# Patient Record
Sex: FEMALE | Race: WHITE
[De-identification: names, ages, dates, MRNs, and addresses within clinical notes are randomized per-mention and may not be internally consistent; named-entity substitution may affect disease eponyms.]

---

## 2017-06-26 ENCOUNTER — HOSPITAL ENCOUNTER (OUTPATIENT)
Dept: HOSPITAL 61 - MAMMO | Age: 77
Discharge: HOME | End: 2017-06-26
Attending: FAMILY MEDICINE
Payer: MEDICARE

## 2017-06-26 DIAGNOSIS — Z12.31: Primary | ICD-10-CM

## 2017-06-26 NOTE — RAD
DATE: June 26, 2017



EXAM: DIGITAL SCREEN BILAT W/CAD



HISTORY: Screening study



COMPARISON: 2015 and 2016



This study was interpreted with the benefit of Computerized Aided Detection

(CAD).







FINDINGS:



The breast parenchyma shows scattered fibroglandular densities.  There are no

dominant suspicious masses, suspicious microcalcifications or evidence of

architectural distortion.





IMPRESSION: No mammographic indicators for malignancy.







BI-RADS CATEGORY: 1 NEGATIVE



RECOMMENDED FOLLOW-UP: 12M 12 MONTH FOLLOW-UP



PQRS compliance statement: Patient information was entered into a reminder

system with a target due date June 27, 2018 for the next mammogram.



Mammography is a sensitive method for finding small breast cancers, but it

does not detect them all and is not a substitute for careful clinical

examination.  A negative mammogram does not negate a clinically suspicious

finding and should not result in delay in biopsying a clinically suspicious

abnormality.



"Our facility is accredited by the [employer] Mammography

Program."





The patient's breast density may affect the ability of mammography to detect

breast cancer. There are 4 categories of breast density, A, B, C and D. Breast

density A means that most of the breast tissue is replaced with adipose tissue

and therefore is not dense. Breast density B means that the breast tissue is

mildly dense and scattered. Breast density C means that the breast tissue is

heterogeneously dense. Breast density D means that the breast tissue is very

dense. Breast densities especially C and D may decrease the sensitivity of

mammography to detect breast cancer. Therefore, the patient may benefit from

3-D breast mammography (3D breast tomography) as a part of their screening

mammogram. Insurance may or may not pay for this additional imaging. The

patient's breast density based on today's mammogram is category B.

## 2018-07-26 ENCOUNTER — HOSPITAL ENCOUNTER (EMERGENCY)
Dept: HOSPITAL 61 - ER | Age: 78
Discharge: HOME | End: 2018-07-26
Payer: MEDICARE

## 2018-07-26 DIAGNOSIS — Z86.79: ICD-10-CM

## 2018-07-26 DIAGNOSIS — N39.0: Primary | ICD-10-CM

## 2018-07-26 DIAGNOSIS — Z95.0: ICD-10-CM

## 2018-07-26 DIAGNOSIS — R06.02: ICD-10-CM

## 2018-07-26 DIAGNOSIS — I10: ICD-10-CM

## 2018-07-26 DIAGNOSIS — R07.89: ICD-10-CM

## 2018-07-26 LAB
ADD MAN DIFF?: NO
ALBUMIN SERPL-MCNC: 3.7 G/DL (ref 3.4–5)
ALBUMIN/GLOB SERPL: 1.2 {RATIO} (ref 1–1.7)
ALP SERPL-CCNC: 73 U/L (ref 46–116)
ALT (SGPT): 20 U/L (ref 14–59)
ANION GAP SERPL CALC-SCNC: 9 MMOL/L (ref 6–14)
AST SERPL-CCNC: 16 U/L (ref 15–37)
BACTERIA,URINE: (no result) /HPF
BASO #: 0.1 X10^3/UL (ref 0–0.2)
BASO %: 1 % (ref 0–3)
BILIRUBIN,URINE: NEGATIVE
BLOOD UREA NITROGEN: 17 MG/DL (ref 7–20)
BUN/CREAT SERPL: 17 (ref 6–20)
CALCIUM: 9.3 MG/DL (ref 8.5–10.1)
CHLORIDE: 105 MMOL/L (ref 98–107)
CLARITY,URINE: CLEAR
CO2 SERPL-SCNC: 29 MMOL/L (ref 21–32)
COLOR,URINE: YELLOW
CREAT SERPL-MCNC: 1 MG/DL (ref 0.6–1)
EOS #: 0.1 X10^3/UL (ref 0–0.7)
EOS %: 1 % (ref 0–3)
GFR SERPLBLD BASED ON 1.73 SQ M-ARVRAT: 53.3 ML/MIN
GLOBULIN SER-MCNC: 3 G/DL (ref 2.2–3.8)
GLUCOSE SERPL-MCNC: 125 MG/DL (ref 70–99)
GLUCOSE,URINE: NEGATIVE MG/DL
HCG SERPL-ACNC: 7.9 X10^3/UL (ref 4–11)
HEMATOCRIT: 45 % (ref 36–47)
HEMOGLOBIN: 15.3 G/DL (ref 12–15.5)
LYMPH #: 2.1 X10^3/UL (ref 1–4.8)
LYMPH %: 26 % (ref 24–48)
MAGNESIUM: 2.1 MG/DL (ref 1.8–2.4)
MEAN CORPUSCULAR HEMOGLOBIN: 28 PG (ref 25–35)
MEAN CORPUSCULAR HGB CONC: 34 G/DL (ref 31–37)
MEAN CORPUSCULAR VOLUME: 82 FL (ref 79–100)
MONO #: 0.7 X10^3/UL (ref 0–1.1)
MONO %: 10 % (ref 0–9)
NEUT #: 5 X10^3UL (ref 1.8–7.7)
NEUT %: 63 % (ref 31–73)
NITRITE,URINE: POSITIVE
PH,URINE: 7.5
PLATELET COUNT: 204 X10^3/UL (ref 140–400)
POTASSIUM SERPL-SCNC: 4.5 MMOL/L (ref 3.5–5.1)
PROTEIN,URINE: NEGATIVE MG/DL
RBC,URINE: 0 /HPF (ref 0–2)
RED BLOOD COUNT: 5.49 X10^6/UL (ref 3.5–5.4)
RED CELL DISTRIBUTION WIDTH: 14.6 % (ref 11.5–14.5)
SODIUM: 143 MMOL/L (ref 136–145)
SPECIFIC GRAVITY,URINE: 1.01
TOTAL BILIRUBIN: 0.5 MG/DL (ref 0.2–1)
TOTAL PROTEIN: 6.7 G/DL (ref 6.4–8.2)
TROPONINI: < 0.017 NG/ML (ref 0–0.06)
UROBILINOGEN,URINE: 0.2 MG/DL

## 2018-07-26 PROCEDURE — 87086 URINE CULTURE/COLONY COUNT: CPT

## 2018-07-26 PROCEDURE — 93005 ELECTROCARDIOGRAM TRACING: CPT

## 2018-07-26 PROCEDURE — 96365 THER/PROPH/DIAG IV INF INIT: CPT

## 2018-07-26 PROCEDURE — 83735 ASSAY OF MAGNESIUM: CPT

## 2018-07-26 PROCEDURE — 71045 X-RAY EXAM CHEST 1 VIEW: CPT

## 2018-07-26 PROCEDURE — 36415 COLL VENOUS BLD VENIPUNCTURE: CPT

## 2018-07-26 PROCEDURE — 85025 COMPLETE CBC W/AUTO DIFF WBC: CPT

## 2018-07-26 PROCEDURE — 80053 COMPREHEN METABOLIC PANEL: CPT

## 2018-07-26 PROCEDURE — 81001 URINALYSIS AUTO W/SCOPE: CPT

## 2018-07-26 PROCEDURE — 99285 EMERGENCY DEPT VISIT HI MDM: CPT

## 2018-07-26 PROCEDURE — 84484 ASSAY OF TROPONIN QUANT: CPT

## 2018-07-26 RX ADMIN — BACITRACIN 1 MLS/HR: 5000 INJECTION, POWDER, FOR SOLUTION INTRAMUSCULAR at 09:32

## 2018-07-28 VITALS
DIASTOLIC BLOOD PRESSURE: 70 MMHG | SYSTOLIC BLOOD PRESSURE: 167 MMHG | DIASTOLIC BLOOD PRESSURE: 70 MMHG | DIASTOLIC BLOOD PRESSURE: 70 MMHG | SYSTOLIC BLOOD PRESSURE: 167 MMHG | DIASTOLIC BLOOD PRESSURE: 70 MMHG | SYSTOLIC BLOOD PRESSURE: 167 MMHG | DIASTOLIC BLOOD PRESSURE: 70 MMHG | SYSTOLIC BLOOD PRESSURE: 167 MMHG | DIASTOLIC BLOOD PRESSURE: 70 MMHG | SYSTOLIC BLOOD PRESSURE: 167 MMHG | SYSTOLIC BLOOD PRESSURE: 167 MMHG

## 2018-09-05 ENCOUNTER — HOSPITAL ENCOUNTER (OUTPATIENT)
Dept: HOSPITAL 61 - MAMMO | Age: 78
Discharge: HOME | End: 2018-09-05
Attending: FAMILY MEDICINE
Payer: MEDICARE

## 2018-09-05 DIAGNOSIS — Z12.31: Primary | ICD-10-CM

## 2018-09-05 DIAGNOSIS — Z82.49: ICD-10-CM

## 2018-09-05 DIAGNOSIS — Z83.3: ICD-10-CM

## 2018-09-05 DIAGNOSIS — Z90.49: ICD-10-CM

## 2018-09-05 DIAGNOSIS — Z86.79: ICD-10-CM

## 2018-09-05 DIAGNOSIS — Z95.0: ICD-10-CM

## 2018-09-05 DIAGNOSIS — Z90.710: ICD-10-CM

## 2018-09-05 DIAGNOSIS — I10: ICD-10-CM

## 2018-09-05 PROCEDURE — 77067 SCR MAMMO BI INCL CAD: CPT

## 2018-09-05 PROCEDURE — 77063 BREAST TOMOSYNTHESIS BI: CPT

## 2018-09-06 NOTE — RAD
DATE: 9/5/2018



EXAM: MAMMO [place] SCREENING BILATERAL



HISTORY: Routine screening



COMPARISON: 6/26/2017



This study was interpreted with the benefit of Computerized Aided Detection

(CAD).





Breast Density: SCATTERED The breast parenchyma shows scattered fibroglandular

densities. Breast parenchyma level B.





FINDINGS: 2-D and 3-D tomosynthesis imaging was performed in CC and MLO

projections.  No new or enlarging breast densities are seen.  Benign type

calcifications are evident.  No suspicious microcalcifications have developed.

 





IMPRESSION: Stable mammograms without evidence of malignancy.







BI-RADS CATEGORY: 2 BENIGN FINDING(S)



RECOMMENDED FOLLOW-UP: 12M 12 MONTH FOLLOW-UP



PQRS compliance statement: Patient information was entered into a reminder

system with a target due date     for the next mammogram.



Mammography is a sensitive method for finding small breast cancers, but it

does not detect them all and is not a substitute for careful clinical

examination.  A negative mammogram does not negate a clinically suspicious

finding and should not result in delay in biopsying a clinically suspicious

abnormality.



"Our facility is accredited by the [employer] Mammography

Program."

## 2019-08-29 ENCOUNTER — HOSPITAL ENCOUNTER (OUTPATIENT)
Dept: HOSPITAL 61 - MAMMO | Age: 79
Discharge: HOME | End: 2019-08-29
Attending: FAMILY MEDICINE
Payer: MEDICARE

## 2019-08-29 DIAGNOSIS — N64.89: ICD-10-CM

## 2019-08-29 DIAGNOSIS — Z12.31: Primary | ICD-10-CM

## 2019-08-29 PROCEDURE — 77063 BREAST TOMOSYNTHESIS BI: CPT

## 2019-08-29 PROCEDURE — 77067 SCR MAMMO BI INCL CAD: CPT

## 2019-08-30 NOTE — RAD
BILATERAL SCREENING MAMMOGRAM, 3-D

 

History: Routine screening.

 

Comparison:  9/5/2018 and 6/26/2017 mammographic exams. 

 

Technique:  MLO and CC digital tomosynthesis (3D) images obtained. 

Radiologist reviewed these images on dedicated workstation. CAD was 

utilized. Or

 

Findings: 

Breast Tissue Density B : There are scattered areas of fibroglandular 

density.

 

There are no dominant masses, suspicious microcalcifications, or 

architectural distortion.

 

IMPRESSION:

No mammographic evidence of malignancy. Recommend routine screening.

 

BI-RADS category 1:  Negative.

 

The images were reviewed with computer-aided detection.

 

Patient information is entered into reminder system with a target due date

for the next screening mammogram.

 

Mammography is the most sensitive method for finding small breast cancers,

but it does not detect them all and is not a substitute for careful 

clinical examination. A negative mammogram does not negate a clinically 

suspicious finding and should not result in delay in biopsying a 

clinically suspicious abnormality.

 

 "Our facility is accredited by the American [employer] 

Mammography Program."

 

Electronically signed by: Lonny Flores MD (8/30/2019 3:43 PM) [facility]

## 2019-11-14 ENCOUNTER — HOSPITAL ENCOUNTER (OUTPATIENT)
Dept: HOSPITAL 61 - ECHO | Age: 79
Discharge: HOME | End: 2019-11-14
Attending: INTERNAL MEDICINE
Payer: MEDICARE

## 2019-11-14 DIAGNOSIS — I05.9: Primary | ICD-10-CM

## 2019-11-14 DIAGNOSIS — I11.9: ICD-10-CM

## 2019-11-14 DIAGNOSIS — I25.10: ICD-10-CM

## 2019-11-14 PROCEDURE — 93306 TTE W/DOPPLER COMPLETE: CPT

## 2019-11-14 NOTE — CARD
MR#: [id number]

Account#: [id number]

Accession#: [id number]

Date of Study: 11/14/2019

Ordering Physician: JAYLA STEWART, 

Referring Physician: JAYLA STEWART 

Tech: Sierra Blake RDCS





--------------- APPROVED REPORT --------------





EXAM: Two-dimensional and M-mode echocardiogram with Doppler and color Doppler.



Other Information 

Quality : Good



INDICATION

Cardiac Disease: CAD 



Surgery/Intervention

Pacemaker: 



2D DIMENSIONS 

RVDd2.3 (2.9-3.5cm)Left Atrium(2D)2.9 (1.6-4.0cm)

IVSd1.2 (0.7-1.1cm)Aortic Root(2D)2.7 (2.0-3.7cm)

LVDd4.1 (3.9-5.9cm)LVOT Diameter2.1 (1.8-2.4cm)

PWd0.8 (0.7-1.1cm)LVDs2.9 (2.5-4.0cm)

FS (%) 29.8 %SV43.2 ml

LVEF(%)57.3 (>50%)



Aortic Valve

AoV Peak Bakari.120.4cm/sAoV VTI20.6cm

AO Peak GR.5.8mmHgLVOT Peak Bakari.92.9cm/s

LVOT  VTI 14.66cmAO Mean GR.3mmHg

DAWSON (VMAX)2.21qn8OBW   (VTI)2.49cm2



Mitral Valve

MV E Jnwycpba28.8cm/sMV DECEL [id number]

MV A Enhqoyux684.0cm/sMV ISB73gu

E/A  Ratio0.6MVA (PHT)3.71cm2



TDI

E/Lateral E'13.1E/Medial E'15.4



Tricuspid Valve

TR P. Dhnfzzkm850ta/sRAP OEYXHUIS5yiVq

TR Peak Gr.55qtSzAWEZ82izHz



Pulmonary Vein

S1 Eitbrgjs81.9cm/sD2 Qpfgmrha40.0cm/s



 LEFT VENTRICLE 

The left ventricle is normal size. There is mild asymmetric septal hypertrophy. The left ventricular 
systolic function is normal. The Ejection Fraction is 55-60%. There is normal LV segmental wall motio
n. Transmitral Doppler flow pattern is Grade I-abnormal relaxation pattern.



 RIGHT VENTRICLE 

The right ventricle is normal size. The right ventricular systolic function is normal.



 ATRIA 

The left atrium size is normal. The right atrium size is normal. The interatrial septum is intact wit
h no evidence for an atrial septal defect or patent foramen ovale as noted on 2-D or Doppler imaging.




 AORTIC VALVE 

The aortic valve is not well visualized but appears to be functioning normally by Doppler interrogati
on. Doppler and Color Flow revealed no significant aortic regurgitation. There is no significant aort
ic valvular stenosis.



 MITRAL VALVE 

The mitral valve is calcified but opens well. There is no evidence of mitral valve prolapse. There is
 no mitral valve stenosis. Doppler and Color-flow revealed trace mitral regurgitation.



 TRICUSPID VALVE 

The tricuspid valve is normal in structure and function. Doppler and Color Flow revealed trace tricus
pid regurgitation. There is no tricuspid valve stenosis.



 PULMONIC VALVE 

The pulmonic valve is not well visualized. Doppler and Color Flow revealed no pulmonic valvular regur
gitation. There is no pulmonic valvular stenosis.



 GREAT VESSELS 

The aortic root is normal in size. The ascending aorta is normal in size. The IVC was not visualized.




 PERICARDIAL EFFUSION 

There is no evidence of significant pericardial effusion.



Critical Notification

Critical Value: No



<Conclusion>

The left ventricular systolic function is normal.

The Ejection Fraction is 55-60%.

There is normal LV segmental wall motion.

Transmitral Doppler flow pattern is Grade I-abnormal relaxation pattern.

Trace mitral regurgitation.

Trace tricuspid regurgitation.

There is no evidence of significant pericardial effusion.



Signed by : Jayla Stewart, 

Electronically Approved : 11/14/2019 10:52:00

## 2020-08-31 ENCOUNTER — HOSPITAL ENCOUNTER (OUTPATIENT)
Dept: HOSPITAL 61 - MAMMO | Age: 80
Discharge: HOME | End: 2020-08-31
Attending: FAMILY MEDICINE
Payer: MEDICARE

## 2020-08-31 DIAGNOSIS — Z12.31: Primary | ICD-10-CM

## 2020-08-31 PROCEDURE — 77067 SCR MAMMO BI INCL CAD: CPT

## 2020-08-31 PROCEDURE — 77063 BREAST TOMOSYNTHESIS BI: CPT

## 2020-08-31 NOTE — RAD
DATE: 8/31/2020 10:35 AM



EXAM: MAMMO [place] SCREENING BILATERAL



HISTORY:  Screening



COMPARISON: 8/29/2019



Bilateral CC and MLO views of the breasts were performed. Bilateral breast

tomosynthesis was performed in CC and MLO projections.



This study was interpreted with the benefit of Computerized Aided Detection

(CAD).





FINDINGS:



Breast Density: SCATTERED  The breast parenchyma shows scattered

fibroglandular densities. Breast parenchyma level B





No suspicious masses, microcalcifications or architectural distortion is

present to suggest malignancy in either breast.





The visualized axillae are unremarkable. 



IMPRESSION: No mammographic evidence of malignancy. 



BI-RADS CATEGORY: 1 NEGATIVE



RECOMMENDED FOLLOW-UP: 12M 12 MONTH FOLLOW-UP Annual screening mammography is

recommended, unless clinically indicated sooner based on symptoms or change in

physical exam.



PQRS compliance statement: Patient information was entered into a reminder

system with a target due date for the next mammogram.



Mammography is a sensitive method for finding small breast cancers, but it

does not detect them all and is not a substitute for careful clinical

examination.  A negative mammogram does not negate a clinically suspicious

finding and should not result in delay in biopsying a clinically suspicious

abnormality.



"Our facility is accredited by the American [employer] Mammography

Program."

## 2020-12-17 ENCOUNTER — HOSPITAL ENCOUNTER (OUTPATIENT)
Dept: HOSPITAL 61 - ECHO | Age: 80
End: 2020-12-17
Attending: INTERNAL MEDICINE
Payer: MEDICARE

## 2020-12-17 DIAGNOSIS — I25.10: ICD-10-CM

## 2020-12-17 DIAGNOSIS — I08.0: Primary | ICD-10-CM

## 2020-12-17 PROCEDURE — 93306 TTE W/DOPPLER COMPLETE: CPT

## 2020-12-17 NOTE — CARD
MR#: [id number]

Account#: [id number]

Accession#: [id number]

Date of Study: 12/17/2020

Ordering Physician: MAE GARCIA, 

Referring Physician: MAE GARCIA, 

Tech: Sierra Blake [facility]





--------------- APPROVED REPORT --------------





EXAM: Two-dimensional and M-mode echocardiogram with Doppler and color Doppler.



Other Information 

Quality : Fair



INDICATION

Cardiac Disease: CAD 

Pacemaker



2D DIMENSIONS 

RVDd2.4 (2.9-3.5cm)Left Atrium(2D)2.6 (1.6-4.0cm)

IVSd1.1 (0.7-1.1cm)Aortic Root(2D)2.6 (2.0-3.7cm)

LVDd2.9 (3.9-5.9cm)LVOT Diameter2.0 (1.8-2.4cm)

PWd1.1 (0.7-1.1cm)LVDs1.5 (2.5-4.0cm)

FS (%) 30.0 %SV26.7 ml

LVEF(%)60.0 (>50%)



Aortic Valve

AoV Peak Bakari.112.4cm/sAoV VTI19.7cm

AO Peak GR.5.1mmHgLVOT Peak Bakari.89.2cm/s

LVOT  VTI 15.99cmAO Mean GR.3mmHg

DAWSON (VMAX)2.85mv0VKK   (VTI)2.57cm2



Mitral Valve

MV E Joximzpu35.5cm/sMV DECEL [id number]

MV A Sowjckcy922.5cm/sMV PXG11li

E/A  Ratio0.6MVA (PHT)3.71cm2



TDI

E/Lateral E'9.7E/Medial E'10.0



Tricuspid Valve

TR P. Yucjnych553vh/sRAP BAXQSUJQ1sdLz

TR Peak Gr.81psYeIQRS50tcIq



Pulmonary Vein

S1 Zxblmajj59.1cm/sD2 Bpxjsuoh29.1cm/s



 LEFT VENTRICLE 

The left ventricle is normal size. There is normal left ventricular wall thickness. The left ventricu
lar systolic function is normal and the ejection fraction is within normal range. The Ejection Fracti
on is 55-60%. There is normal LV segmental wall motion. Transmitral Doppler flow pattern is Grade I-a
bnormal relaxation pattern.



 RIGHT VENTRICLE 

The right ventricle is normal size. The right ventricular systolic function is normal.



 ATRIA 

The left atrium size is normal. The right atrium size is normal. The interatrial septum is intact wit
h no evidence for an atrial septal defect or patent foramen ovale as noted on 2-D or Doppler imaging.




 AORTIC VALVE 

The aortic valve is calcified but opens well. Doppler and Color Flow revealed no significant aortic r
egurgitation. There is no significant aortic valvular stenosis.



 MITRAL VALVE 

The mitral valve is calcified but opens well. Mitral annular calcification is mild. There is no evide
nce of mitral valve prolapse. There is no mitral valve stenosis. Doppler and Color Flow revealed no m
itral valve regurgitation noted.



 TRICUSPID VALVE 

The tricuspid valve is normal in structure and function. Doppler and Color Flow revealed no tricuspid
 valve regurgitation noted. There is no tricuspid valve stenosis.



 PULMONIC VALVE 

The pulmonic valve is not well visualized. Doppler and Color Flow revealed no pulmonic valvular regur
gitation. There is no pulmonic valvular stenosis.



 GREAT VESSELS 

The aortic root is normal in size. The ascending aorta is normal in size. The IVC was not visualized.




 PERICARDIAL EFFUSION 

There is no evidence of significant pericardial effusion.



Critical Notification

Critical Value: No



<Conclusion>

The left ventricle is normal size.

The left ventricular systolic function is normal and the ejection fraction is within normal range.

The Ejection Fraction is 55-60%.

Doppler and Color Flow revealed no significant aortic regurgitation.

There is no significant aortic valvular stenosis.

Doppler and Color Flow revealed no mitral valve regurgitation noted.

Doppler and Color Flow revealed no tricuspid valve regurgitation noted.



Signed by : Chandler Wakefield MD

Electronically Approved : 12/17/2020 17:41:55

## 2021-09-01 ENCOUNTER — HOSPITAL ENCOUNTER (OUTPATIENT)
Dept: HOSPITAL 61 - MAMMO | Age: 81
End: 2021-09-01
Attending: FAMILY MEDICINE
Payer: MEDICARE

## 2021-09-01 DIAGNOSIS — Z12.31: Primary | ICD-10-CM

## 2021-09-01 PROCEDURE — 77067 SCR MAMMO BI INCL CAD: CPT

## 2021-09-01 PROCEDURE — 77063 BREAST TOMOSYNTHESIS BI: CPT

## 2021-09-03 NOTE — RAD
INDICATION: 81 years of age asymptomatic female patient presents for screening mammography.



TECHNIQUE:  Full field craniocaudal and mediolateral oblique images of both breasts were obtained usi
ng digital technique with tomosynthesis and also analyzed with computer-aided detection software.



COMPARISON: Prior mammographic imaging 8/31/2020 8/29/2019 6/26/2017



BREAST COMPOSITION: Category B: There are scattered fibroglandular densities.



FINDINGS:

Benign calcifications are present.  The parenchymal pattern appears stable.



No suspicious masses, microcalcifications or architectural distortion is present to suggest malignanc
y in either breast.



The visualized axillae are unremarkable. 



IMPRESSION: No mammographic evidence of malignancy. 



RECOMMENDATION: Annual screening mammography is recommended, unless clinically indicated sooner based
 on symptoms or change in physical exam.



BIRADS 2: BENIGN 



This study was interpreted with the benefit of Computerized Aided Detection (CAD).



Patient information is entered into the reminder system with a target due date for the next screening
 mammogram.



Mammography is the most sensitive method for finding small breast cancers, but it does not detect the
m all and is not a substitute for careful clinical examination. A negative mammogram does not negate 
a clinically suspicious finding and should not result in delay in biopsying a clinically suspicious a
bnormality.



 "Our facility is accredited by the American [employer] Mammography Program."



Electronically signed by: Fred Rodrigues MD (9/3/2021 10:17 AM) [place] [id number]